# Patient Record
Sex: FEMALE | Race: WHITE
[De-identification: names, ages, dates, MRNs, and addresses within clinical notes are randomized per-mention and may not be internally consistent; named-entity substitution may affect disease eponyms.]

---

## 2020-10-28 ENCOUNTER — DASHBOARD ENCOUNTERS (OUTPATIENT)
Age: 70
End: 2020-10-28

## 2020-10-28 ENCOUNTER — OFFICE VISIT (OUTPATIENT)
Dept: URBAN - NONMETROPOLITAN AREA CLINIC 4 | Facility: CLINIC | Age: 70
End: 2020-10-28
Payer: MEDICARE

## 2020-10-28 DIAGNOSIS — K52.832 LYMPHOCYTIC COLITIS: ICD-10-CM

## 2020-10-28 DIAGNOSIS — K63.9 ILEAL EROSIONS: ICD-10-CM

## 2020-10-28 PROBLEM — 1187071008 LYMPHOCYTIC COLITIS: Status: ACTIVE | Noted: 2020-10-28

## 2020-10-28 PROCEDURE — 99214 OFFICE O/P EST MOD 30 MIN: CPT | Performed by: INTERNAL MEDICINE

## 2020-10-28 RX ORDER — MELATONIN/PYRIDOXINE HCL (B6) 5 MG-10 MG
TABLET,IMMED, EXTENDED RELEASE, BIPHASIC ORAL
Qty: 0 | Refills: 0 | Status: ACTIVE | COMMUNITY
Start: 1900-01-01

## 2020-10-28 RX ORDER — MELATONIN 5 MG
1 TABLET IN THE EVENING TABLET ORAL ONCE A DAY
Status: ACTIVE | COMMUNITY

## 2020-10-28 RX ORDER — GUARANA 1000 MG
TABLET ORAL
Qty: 0 | Refills: 0 | Status: ACTIVE | COMMUNITY
Start: 1900-01-01

## 2020-10-28 RX ORDER — ESCITALOPRAM OXALATE 10 MG/1
1 TABLET TABLET, FILM COATED ORAL ONCE A DAY
Qty: 30 | Status: ACTIVE | COMMUNITY
Start: 2020-10-28

## 2020-10-28 RX ORDER — LISINOPRIL 10 MG/1
TAKE 1 TABLET (10 MG) BY ORAL ROUTE ONCE DAILY TABLET ORAL 1
Qty: 0 | Refills: 0 | Status: ACTIVE | COMMUNITY
Start: 1900-01-01

## 2020-10-28 RX ORDER — CHROMIUM 200 MCG
TABLET ORAL
Qty: 0 | Refills: 0 | Status: ACTIVE | COMMUNITY
Start: 1900-01-01

## 2020-10-28 RX ORDER — B-COMPLEX WITH VITAMIN C
TABLET ORAL
Qty: 0 | Refills: 0 | Status: DISCONTINUED | COMMUNITY
Start: 1900-01-01

## 2020-10-28 RX ORDER — BUDESONIDE 3 MG/1
3 CAPSULES CAPSULE, COATED PELLETS ORAL ONCE A DAY
Qty: 90 CAPSULE | Refills: 4 | OUTPATIENT
Start: 2020-10-28

## 2020-10-28 RX ORDER — CALCIUM CITRATE 200 MG (950 MG) TABLET 200(950)MG
1 TABLET TABLET ORAL ONCE A DAY
Qty: 30 | Status: ACTIVE | COMMUNITY
Start: 2020-10-28 | End: 2020-11-26

## 2020-10-28 RX ORDER — ERGOCALCIFEROL 1.25 MG/1
TAKE 1 CAPSULE BY ORAL ROUTE ONCE DAILY CAPSULE ORAL 1
Qty: 0 | Refills: 0 | Status: ACTIVE | COMMUNITY
Start: 1900-01-01

## 2020-10-28 RX ORDER — BIOTIN 10 MG
TABLET ORAL
Qty: 0 | Refills: 0 | Status: ACTIVE | COMMUNITY
Start: 1900-01-01

## 2020-10-28 RX ORDER — ATORVASTATIN CALCIUM 10 MG/1
1 TABLET TABLET, FILM COATED ORAL ONCE A DAY
Qty: 30 | Status: ACTIVE | COMMUNITY
Start: 2020-10-28

## 2020-10-28 RX ORDER — CALCIUM CARBONATE/VITAMIN D3 600 MG-10
1 CAPSULE TABLET ORAL ONCE A DAY
Status: ACTIVE | COMMUNITY

## 2020-10-28 NOTE — HPI-TODAY'S VISIT:
The patient is a 68 year old /White female , who presents on referral from Khalida Vilchis MD , for a gastroenterology evaluation for above issues. She has longstanding history of bowel dysfunction with alternating diarrhea and constipation since her 20's. However, since end Dec 2018 she has worsening diarrhea  which she initially attributed to a viral illness. Her symptoms have not improved. Her frequency is 8 times per day without blood. Also notes mucus dishcarge. She has nocturnal symptoms. She has cut out dairy with mild improvement. No relationship with certain foods. No fever, chills but has lost 10 lbs. No new medications. No skin rashes, eye pain but has worning joint pains since this started. No family history of IBD. Recent CT showed thick AC. Stool studies are negative. Last colonoscopy was in 2015 in [place] (normal per patient). She consumes on average 8 drinks on weekends. She has been exposed to antibioitics for ear/sinus infections in late 2018.    Follow Up 3/27/2019: Patient presents for post colonoscopy follow up. Biopsies confirmed lymphocytic colitis. She had few aphthous ulcers in TI which were negative for IBD. No use of NSAID's. She did not take Budesonide due to concerns of side effects. Diarrhea is unchanged. She is using Imodium as needed.  She recently sprained her ankle.  Follow Up 10/28/20; Patient presents for unexpected follow up. She has worsening of diarrhea with 10 BM's per day since July 2020. She could not afford Budesonide in the past. No fever, chills, nausea, vomiting.

## 2020-11-04 ENCOUNTER — TELEPHONE ENCOUNTER (OUTPATIENT)
Dept: URBAN - METROPOLITAN AREA CLINIC 54 | Facility: CLINIC | Age: 70
End: 2020-11-04